# Patient Record
Sex: FEMALE | Race: WHITE | NOT HISPANIC OR LATINO | Employment: FULL TIME | URBAN - METROPOLITAN AREA
[De-identification: names, ages, dates, MRNs, and addresses within clinical notes are randomized per-mention and may not be internally consistent; named-entity substitution may affect disease eponyms.]

---

## 2020-02-01 ENCOUNTER — HOSPITAL ENCOUNTER (EMERGENCY)
Facility: HOSPITAL | Age: 30
Discharge: HOME | End: 2020-02-01
Attending: EMERGENCY MEDICINE
Payer: COMMERCIAL

## 2020-02-01 VITALS
DIASTOLIC BLOOD PRESSURE: 68 MMHG | HEART RATE: 74 BPM | TEMPERATURE: 98.4 F | OXYGEN SATURATION: 100 % | RESPIRATION RATE: 16 BRPM | SYSTOLIC BLOOD PRESSURE: 121 MMHG

## 2020-02-01 DIAGNOSIS — Z00.00 NORMAL TONGUE EXAM: Primary | ICD-10-CM

## 2020-02-01 PROCEDURE — 99284 EMERGENCY DEPT VISIT MOD MDM: CPT | Performed by: EMERGENCY MEDICINE

## 2020-02-01 PROCEDURE — 99283 EMERGENCY DEPT VISIT LOW MDM: CPT

## 2020-02-01 RX ORDER — MULTIVIT WITH MINERALS/LUTEIN
1000 TABLET ORAL DAILY
COMMUNITY

## 2020-02-01 RX ORDER — VALACYCLOVIR HYDROCHLORIDE 500 MG/1
500 TABLET, FILM COATED ORAL 2 TIMES DAILY PRN
COMMUNITY

## 2020-02-01 RX ORDER — IBUPROFEN 600 MG/1
600 TABLET ORAL ONCE
Status: COMPLETED | OUTPATIENT
Start: 2020-02-01 | End: 2020-02-01

## 2020-02-01 RX ADMIN — IBUPROFEN 600 MG: 600 TABLET, FILM COATED ORAL at 03:31

## 2020-02-01 NOTE — DISCHARGE INSTRUCTIONS
Return to the ER for further concerns or worsening symptoms  Follow up with your primary care physician and ENT in 1-2 days  Take tylenol or motrin for pain

## 2020-02-10 NOTE — ED PROVIDER NOTES
History  Chief Complaint   Patient presents with    Tongue Swelling      pt states her tongue was hurting  States she has been sick with upper respiratory symptoms  Was at the doctor and states the doctor looked at it and didn't notice anything  States it hurts when she swallows     Pt in ER with c/o pain and swelling to her tongue, with difficulty swallowing  Pt has recently had a URI  She was evaluated by her PCP for the same complaint  She denies fevers/chills  She denies new foods/drinks  Pt is speaking in complete sentences and is in no distress on initial eval        History provided by:  Patient   used: No        Prior to Admission Medications   Prescriptions Last Dose Informant Patient Reported? Taking? Ascorbic Acid (VITAMIN C) 1000 MG tablet   Yes Yes   Sig: Take 1,000 mg by mouth daily   valACYclovir (VALTREX) 500 mg tablet   Yes Yes   Sig: Take 500 mg by mouth 2 (two) times a day as needed      Facility-Administered Medications: None       Past Medical History:   Diagnosis Date    Lupus [facility])        Past Surgical History:   Procedure Laterality Date    TONSILLECTOMY         History reviewed  No pertinent family history  I have reviewed and agree with the history as documented  Social History     Tobacco Use    Smoking status: Never Smoker   Substance Use Topics    Alcohol use: Yes     Comment: 5 times a week, approximately 2 glasses of wine    Drug use: Never        Review of Systems   Constitutional: Negative for chills and fever  HENT: Positive for trouble swallowing  Negative for drooling, facial swelling, mouth sores, sore throat and voice change  Respiratory: Negative for cough, chest tightness and shortness of breath  Gastrointestinal: Negative for abdominal pain, diarrhea, nausea and vomiting  Genitourinary: Negative for dysuria, frequency, hematuria and urgency  Musculoskeletal: Negative for back pain, neck pain and neck stiffness     All other systems reviewed and are negative  Physical Exam  Physical Exam   Constitutional: She is oriented to person, place, and time  She appears well-developed and well-nourished  No distress  HENT:   Head: Normocephalic and atraumatic  Eyes: Pupils are equal, round, and reactive to light  Conjunctivae are normal    Neck: Normal range of motion  Neck supple  Cardiovascular: Normal rate, regular rhythm and normal heart sounds  No murmur heard  Pulmonary/Chest: Effort normal and breath sounds normal  No respiratory distress  Abdominal: Soft  Bowel sounds are normal  She exhibits no distension  There is no tenderness  Musculoskeletal: Normal range of motion  She exhibits no edema or deformity  Neurological: She is alert and oriented to person, place, and time  No cranial nerve deficit  Skin: Skin is warm and dry  No rash noted  She is not diaphoretic  No pallor  Psychiatric: She has a normal mood and affect  Her behavior is normal    Nursing note and vitals reviewed  Vital Signs  ED Triage Vitals [02/01/20 0249]   Temperature Pulse Respirations Blood Pressure SpO2   98 4 °F (36 9 °C) 74 16 121/68 100 %      Temp Source Heart Rate Source Patient Position - Orthostatic VS BP Location FiO2 (%)   Tympanic Monitor Sitting Left arm --      Pain Score       5           Vitals:    02/01/20 0249   BP: 121/68   Pulse: 74   Patient Position - Orthostatic VS: Sitting         Visual Acuity      ED Medications  Medications   ibuprofen (MOTRIN) tablet 600 mg (600 mg Oral Given 2/1/20 0331)       Diagnostic Studies  Results Reviewed     None                 No orders to display              Procedures  Procedures         ED Course                               MDM  Number of Diagnoses or Management Options  Normal tongue exam:   Diagnosis management comments: No tongue swelling noted   Will d/c to home    Risk of Complications, Morbidity, and/or Mortality  Presenting problems: minimal  Diagnostic procedures: minimal  Management options: minimal    Patient Progress  Patient progress: stable        Disposition  Final diagnoses:   Normal tongue exam     Time reflects when diagnosis was documented in both MDM as applicable and the Disposition within this note     Time User Action Codes Description Comment    2/1/2020  3:27 AM Jerrell Maki Add [Z00 00] Normal tongue exam       ED Disposition     ED Disposition Condition Date/Time Comment    Discharge Stable Sat Feb 1, 2020  3:27 AM Elease Dryer discharge to home/self care  Follow-up Information     Follow up With Specialties Details Why Contact Info Additional Information    [facility] Schedule an appointment as soon as possible for a visit in 3 days for follow up [address] [place] [phone]  [address] [facility], [address] [place] [address] Mauri Conte [place], [phone], [phone]          Discharge Medication List as of 2/1/2020  3:28 AM      CONTINUE these medications which have NOT CHANGED    Details   Ascorbic Acid (VITAMIN C) 1000 MG tablet Take 1,000 mg by mouth daily, Historical Med      valACYclovir (VALTREX) 500 mg tablet Take 500 mg by mouth 2 (two) times a day as needed, Historical Med           No discharge procedures on file      ED Provider  Electronically Signed by           Annie Lanier DO  02/10/20 0229